# Patient Record
Sex: MALE | Race: BLACK OR AFRICAN AMERICAN | NOT HISPANIC OR LATINO | URBAN - METROPOLITAN AREA
[De-identification: names, ages, dates, MRNs, and addresses within clinical notes are randomized per-mention and may not be internally consistent; named-entity substitution may affect disease eponyms.]

---

## 2017-05-01 ENCOUNTER — HOSPITAL ENCOUNTER (EMERGENCY)
Facility: CLINIC | Age: 52
Discharge: HOME | End: 2017-05-01
Attending: PSYCHIATRY & NEUROLOGY | Admitting: PSYCHIATRY & NEUROLOGY
Payer: COMMERCIAL

## 2017-05-01 VITALS
BODY MASS INDEX: 28.69 KG/M2 | DIASTOLIC BLOOD PRESSURE: 77 MMHG | SYSTOLIC BLOOD PRESSURE: 117 MMHG | RESPIRATION RATE: 14 BRPM | OXYGEN SATURATION: 96 % | TEMPERATURE: 97 F | HEIGHT: 78 IN | HEART RATE: 102 BPM | WEIGHT: 248 LBS

## 2017-05-01 DIAGNOSIS — G47.00 PERSISTENT INSOMNIA: ICD-10-CM

## 2017-05-01 DIAGNOSIS — F43.10 PTSD (POST-TRAUMATIC STRESS DISORDER): ICD-10-CM

## 2017-05-01 DIAGNOSIS — F22 PSYCHOSIS, PARANOID (H): ICD-10-CM

## 2017-05-01 PROCEDURE — 99284 EMERGENCY DEPT VISIT MOD MDM: CPT | Mod: Z6 | Performed by: PSYCHIATRY & NEUROLOGY

## 2017-05-01 PROCEDURE — 99283 EMERGENCY DEPT VISIT LOW MDM: CPT

## 2017-05-01 RX ORDER — OLANZAPINE 10 MG/1
10 TABLET ORAL AT BEDTIME
Qty: 30 TABLET | Refills: 0 | Status: SHIPPED | OUTPATIENT
Start: 2017-05-01

## 2017-05-01 RX ORDER — HYDROXYZINE HYDROCHLORIDE 50 MG/1
50-100 TABLET, FILM COATED ORAL EVERY 6 HOURS PRN
Qty: 60 TABLET | Refills: 0 | Status: SHIPPED | OUTPATIENT
Start: 2017-05-01

## 2017-05-01 ASSESSMENT — ENCOUNTER SYMPTOMS
NERVOUS/ANXIOUS: 1
ABDOMINAL PAIN: 0
SLEEP DISTURBANCE: 1
FEVER: 0
SHORTNESS OF BREATH: 0
DYSPHORIC MOOD: 0
HALLUCINATIONS: 1
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
## 2017-05-01 NOTE — ED AVS SNAPSHOT
[facility], [place], Emergency Department    [address] [place] [address]    Phone:  [phone]    Fax:  [phone]                                       Ilya Moore   MRN: [id number]    Department:  [facility], Emergency Department   Date of Visit:  5/1/2017           After Visit Summary Signature Page     I have received my discharge instructions, and my questions have been answered. I have discussed any challenges I see with this plan with the nurse or doctor.    ..........................................................................................................................................  Patient/Patient Representative Signature      ..........................................................................................................................................  Patient Representative Print Name and Relationship to Patient    ..................................................               ................................................  Date                                            Time    ..........................................................................................................................................  Reviewed by Signature/Title    ...................................................              ..............................................  Date                                                            Time

## 2017-05-01 NOTE — ED AVS SNAPSHOT
[facility], Emergency Department    [address]    [place] [address]    Phone:  [phone]    Fax:  [phone]                                       Ilya Moore   MRN: [id number]    Department:  [facility], Emergency Department   Date of Visit:  5/1/2017           Patient Information     Date Of Birth          1965        Your diagnoses for this visit were:     PTSD (post-traumatic stress disorder)     Persistent insomnia     Psychosis, paranoid (H)        You were seen by Brandon Andrade MD.        Discharge Instructions       Restart hydroxyzine  mg every 6 hours as needed for anxiety/sleep    Restart zyprexa 10 mg at bedtime for sleep and your hallucinations    Follow up with [facility] to help contact your  and get services in place    24 Hour Appointment Hotline       To make an appointment at any [facility], call [phone] ([phone]). If you don't have a family doctor or clinic, we will help you find one. [facility] are conveniently located to serve the needs of you and your family.             Review of your medicines      CONTINUE these medicines which may have CHANGED, or have new prescriptions. If we are uncertain of the size of tablets/capsules you have at home, strength may be listed as something that might have changed.        Dose / Directions Last dose taken    hydrOXYzine 50 MG tablet   Commonly known as:  ATARAX   Dose:   mg   What changed:  when to take this   Quantity:  60 tablet        Take 1-2 tablets ( mg) by mouth every 6 hours as needed for anxiety   Refills:  0          Our records show that you are taking the medicines listed below. If these are incorrect, please call your family doctor or clinic.        Dose / Directions Last dose taken    alum & mag hydroxide-simethicone 200-200-20 MG/5ML Susp suspension   Commonly known as:  MYLANTA/MAALOX   Dose:  30 mL        Take 30 mLs by mouth every 6  hours as needed for indigestion   Refills:  0        furosemide 20 MG tablet   Commonly known as:  LASIX   Dose:  20 mg   Quantity:  30 tablet        Take 1 tablet (20 mg) by mouth daily   Refills:  1        guaiFENesin 20 mg/mL Soln solution   Commonly known as:  ROBITUSSIN   Dose:  10 mL        Take 10 mLs by mouth every 4 hours as needed for cough   Refills:  0        * IBUPROFEN PO   Dose:  200-400 mg        Take 200-400 mg by mouth every 6 hours as needed for moderate pain   Refills:  0        * ibuprofen 600 MG tablet   Commonly known as:  ADVIL/MOTRIN   Dose:  600 mg   Quantity:  120 tablet        Take 1 tablet (600 mg) by mouth every 6 hours as needed for moderate pain   Refills:  1        loperamide 2 MG capsule   Commonly known as:  IMODIUM   Dose:  2 mg        Take 2 mg by mouth 4 times daily as needed for diarrhea   Refills:  0        loratadine 10 MG tablet   Commonly known as:  CLARITIN   Dose:  10 mg        Take 10 mg by mouth daily as needed for allergies   Refills:  0        methyl salicylate-menthol Oint ointment   Dose:  1 g   Quantity:  1 Tube        Apply 1 g topically every 6 hours as needed   Refills:  1        OLANZapine 10 MG tablet   Commonly known as:  zyPREXA   Dose:  10 mg   Quantity:  30 tablet        Take 1 tablet (10 mg) by mouth At Bedtime   Refills:  0        omeprazole 20 MG CR capsule   Commonly known as:  priLOSEC   Dose:  20 mg   Quantity:  30 capsule        Take 1 capsule (20 mg) by mouth every morning (before breakfast)   Refills:  1        phenol-menthol 14.5 MG lozenge   Dose:  1 lozenge        Place 1 lozenge inside cheek every 2 hours as needed for moderate pain   Refills:  0        senna-docusate 8.6-50 MG per tablet   Commonly known as:  SENOKOT-S;PERICOLACE   Dose:  2 tablet        Take 2 tablets by mouth nightly as needed for constipation   Refills:  0        traZODone 100 MG tablet   Commonly known as:  DESYREL   Dose:  100 mg   Quantity:  60 tablet        Take 1  "tablet (100 mg) by mouth nightly as needed for sleep Patient may repeat once, if he so desires.   Refills:  1        * Notice:  This list has 2 medication(s) that are the same as other medications prescribed for you. Read the directions carefully, and ask your doctor or other care provider to review them with you.            Prescriptions were sent or printed at these locations (2 Prescriptions)                   Other Prescriptions                Printed at Department/Unit printer (2 of 2)         hydrOXYzine (ATARAX) 50 MG tablet               OLANZapine (ZYPREXA) 10 MG tablet                Orders Needing Specimen Collection     None      Pending Results     No orders found from 2017 to 2017.            Pending Culture Results     No orders found from 2017 to 2017.            Thank you for choosing [facility]       Thank you for choosing [facility] for your care. Our goal is always to provide you with excellent care. Hearing back from our patients is one way we can continue to improve our services. Please take a few minutes to complete the written survey that you may receive in the mail after you visit with us. Thank you!        [facility] Information     [facility] lets you send messages to your doctor, view your test results, renew your prescriptions, schedule appointments and more. To sign up, go to [email or web address] . Click on \"Log in\" on the left side of the screen, which will take you to the Welcome page. Then click on \"Sign up Now\" on the right side of the page.     You will be asked to enter the access code listed below, as well as some personal information. Please follow the directions to create your username and password.     Your access code is: [email or web address]  Expires: 2017 12:58 PM     Your access code will  in 90 days. If you need help or a new code, please call your [facility] clinic or [phone].        Care EveryWhere ID     This is your Care EveryWhere ID. This could be " used by other organizations to access your [place] medical records  [id number]        After Visit Summary       This is your record. Keep this with you and show to your community pharmacist(s) and doctor(s) at your next visit.

## 2017-05-01 NOTE — DISCHARGE INSTRUCTIONS
Restart hydroxyzine  mg every 6 hours as needed for anxiety/sleep    Restart zyprexa 10 mg at bedtime for sleep and your hallucinations    Follow up with [facility] services to help contact your  and get services in place

## 2017-05-17 ENCOUNTER — TRANSFERRED RECORDS (OUTPATIENT)
Dept: HEALTH INFORMATION MANAGEMENT | Facility: CLINIC | Age: 52
End: 2017-05-17

## 2017-05-17 ENCOUNTER — MEDICAL CORRESPONDENCE (OUTPATIENT)
Dept: HEALTH INFORMATION MANAGEMENT | Facility: CLINIC | Age: 52
End: 2017-05-17

## 2020-06-29 ENCOUNTER — COMMUNICATION - HEALTHEAST (OUTPATIENT)
Dept: FAMILY MEDICINE | Facility: CLINIC | Age: 55
End: 2020-06-29

## 2020-06-30 ENCOUNTER — COMMUNICATION - HEALTHEAST (OUTPATIENT)
Dept: FAMILY MEDICINE | Facility: CLINIC | Age: 55
End: 2020-06-30

## 2020-06-30 ENCOUNTER — OFFICE VISIT - HEALTHEAST (OUTPATIENT)
Dept: FAMILY MEDICINE | Facility: CLINIC | Age: 55
End: 2020-06-30

## 2020-06-30 DIAGNOSIS — M16.10 HIP ARTHRITIS: ICD-10-CM

## 2020-06-30 DIAGNOSIS — M25.551 HIP PAIN, RIGHT: ICD-10-CM

## 2020-06-30 DIAGNOSIS — G89.29 CHRONIC PAIN OF LEFT KNEE: ICD-10-CM

## 2020-06-30 DIAGNOSIS — M25.562 CHRONIC PAIN OF LEFT KNEE: ICD-10-CM

## 2020-06-30 DIAGNOSIS — M25.561 CHRONIC PAIN OF RIGHT KNEE: ICD-10-CM

## 2020-06-30 DIAGNOSIS — G89.29 CHRONIC PAIN OF RIGHT KNEE: ICD-10-CM

## 2020-06-30 DIAGNOSIS — M25.552 HIP PAIN, LEFT: ICD-10-CM

## 2020-06-30 DIAGNOSIS — F32.1 CURRENT MODERATE EPISODE OF MAJOR DEPRESSIVE DISORDER, UNSPECIFIED WHETHER RECURRENT (H): ICD-10-CM

## 2020-06-30 DIAGNOSIS — M17.10 ARTHRITIS OF KNEE: ICD-10-CM

## 2020-06-30 ASSESSMENT — MIFFLIN-ST. JEOR: SCORE: 2158.02

## 2020-06-30 ASSESSMENT — PATIENT HEALTH QUESTIONNAIRE - PHQ9: SUM OF ALL RESPONSES TO PHQ QUESTIONS 1-9: 12

## 2020-07-03 ENCOUNTER — COMMUNICATION - HEALTHEAST (OUTPATIENT)
Dept: EMERGENCY MEDICINE | Facility: CLINIC | Age: 55
End: 2020-07-03

## 2020-07-04 ENCOUNTER — COMMUNICATION - HEALTHEAST (OUTPATIENT)
Dept: BEHAVIORAL HEALTH | Facility: CLINIC | Age: 55
End: 2020-07-04

## 2020-09-23 ENCOUNTER — RECORDS - HEALTHEAST (OUTPATIENT)
Dept: LAB | Facility: CLINIC | Age: 55
End: 2020-09-23

## 2020-09-23 LAB
ALBUMIN SERPL-MCNC: 4.1 G/DL (ref 3.5–5)
ALP SERPL-CCNC: 65 U/L (ref 45–120)
ALT SERPL W P-5'-P-CCNC: 17 U/L (ref 0–45)
ANION GAP SERPL CALCULATED.3IONS-SCNC: 10 MMOL/L (ref 5–18)
AST SERPL W P-5'-P-CCNC: 12 U/L (ref 0–40)
BILIRUB SERPL-MCNC: 1.3 MG/DL (ref 0–1)
BUN SERPL-MCNC: 10 MG/DL (ref 8–22)
CALCIUM SERPL-MCNC: 8.6 MG/DL (ref 8.5–10.5)
CHLORIDE BLD-SCNC: 107 MMOL/L (ref 98–107)
CHOLEST SERPL-MCNC: 149 MG/DL
CO2 SERPL-SCNC: 25 MMOL/L (ref 22–31)
CREAT SERPL-MCNC: 1 MG/DL (ref 0.7–1.3)
FASTING STATUS PATIENT QL REPORTED: NORMAL
GFR SERPL CREATININE-BSD FRML MDRD: >60 ML/MIN/1.73M2
GLUCOSE BLD-MCNC: 87 MG/DL (ref 70–125)
HDLC SERPL-MCNC: 50 MG/DL
HIV 1+2 AB+HIV1 P24 AG SERPL QL IA: NEGATIVE
LDLC SERPL CALC-MCNC: 85 MG/DL
POTASSIUM BLD-SCNC: 3.9 MMOL/L (ref 3.5–5)
PROT SERPL-MCNC: 6.4 G/DL (ref 6–8)
PSA SERPL-MCNC: 0.2 NG/ML (ref 0–3.5)
SODIUM SERPL-SCNC: 142 MMOL/L (ref 136–145)
TRIGL SERPL-MCNC: 68 MG/DL
TSH SERPL DL<=0.005 MIU/L-ACNC: 1.14 UIU/ML (ref 0.3–5)

## 2020-09-24 ENCOUNTER — RECORDS - HEALTHEAST (OUTPATIENT)
Dept: LAB | Facility: CLINIC | Age: 55
End: 2020-09-24

## 2020-09-24 LAB
FOLATE SERPL-MCNC: 13.6 NG/ML
HBV SURFACE AG SERPL QL IA: NEGATIVE
HCV AB SERPL QL IA: NEGATIVE
IRON SATN MFR SERPL: 43 % (ref 20–50)
IRON SERPL-MCNC: 117 UG/DL (ref 42–175)
TIBC SERPL-MCNC: 274 UG/DL (ref 313–563)
TRANSFERRIN SERPL-MCNC: 219 MG/DL (ref 212–360)
VIT B12 SERPL-MCNC: 259 PG/ML (ref 213–816)

## 2020-10-16 ENCOUNTER — RECORDS - HEALTHEAST (OUTPATIENT)
Dept: LAB | Facility: CLINIC | Age: 55
End: 2020-10-16

## 2020-10-21 LAB
GAMMA INTERFERON BACKGROUND BLD IA-ACNC: 0.21 IU/ML
M TB IFN-G BLD-IMP: NEGATIVE
MITOGEN IGNF BCKGRD COR BLD-ACNC: 0.01 IU/ML
MITOGEN IGNF BCKGRD COR BLD-ACNC: 0.03 IU/ML
QTF INTERPRETATION: NORMAL
QTF MITOGEN - NIL: 5.96 IU/ML

## 2021-03-10 ENCOUNTER — RECORDS - HEALTHEAST (OUTPATIENT)
Dept: LAB | Facility: CLINIC | Age: 56
End: 2021-03-10

## 2021-03-10 LAB
ALBUMIN SERPL-MCNC: 4.2 G/DL (ref 3.5–5)
ALP SERPL-CCNC: 76 U/L (ref 45–120)
ALT SERPL W P-5'-P-CCNC: 13 U/L (ref 0–45)
ANION GAP SERPL CALCULATED.3IONS-SCNC: 8 MMOL/L (ref 5–18)
AST SERPL W P-5'-P-CCNC: 16 U/L (ref 0–40)
BILIRUB SERPL-MCNC: 1.2 MG/DL (ref 0–1)
BUN SERPL-MCNC: 10 MG/DL (ref 8–22)
CALCIUM SERPL-MCNC: 8.7 MG/DL (ref 8.5–10.5)
CHLORIDE BLD-SCNC: 106 MMOL/L (ref 98–107)
CO2 SERPL-SCNC: 27 MMOL/L (ref 22–31)
CREAT SERPL-MCNC: 1.29 MG/DL (ref 0.7–1.3)
GFR SERPL CREATININE-BSD FRML MDRD: 58 ML/MIN/1.73M2
GLUCOSE BLD-MCNC: 92 MG/DL (ref 70–125)
POTASSIUM BLD-SCNC: 4.4 MMOL/L (ref 3.5–5)
PROT SERPL-MCNC: 6.6 G/DL (ref 6–8)
SODIUM SERPL-SCNC: 141 MMOL/L (ref 136–145)

## 2021-04-01 ENCOUNTER — PATIENT OUTREACH (OUTPATIENT)
Dept: CARE COORDINATION | Facility: CLINIC | Age: 56
End: 2021-04-01

## 2021-04-01 DIAGNOSIS — Z65.9 PSYCHOSOCIAL PROBLEM: Primary | ICD-10-CM

## 2021-04-01 SDOH — SOCIAL STABILITY: SOCIAL NETWORK: HOW OFTEN DO YOU ATTEND CHURCH OR RELIGIOUS SERVICES?: MORE THAN 4 TIMES PER YEAR

## 2021-04-01 SDOH — ECONOMIC STABILITY: FOOD INSECURITY: HOW HARD IS IT FOR YOU TO PAY FOR THE VERY BASICS LIKE FOOD, HOUSING, MEDICAL CARE, AND HEATING?: NOT HARD AT ALL

## 2021-04-01 SDOH — SOCIAL STABILITY: SOCIAL INSECURITY
WITHIN THE LAST YEAR, HAVE YOU BEEN RAPED OR FORCED TO HAVE ANY KIND OF SEXUAL ACTIVITY BY YOUR PARTNER OR EX-PARTNER?: NO

## 2021-04-01 SDOH — ECONOMIC STABILITY: FOOD INSECURITY: WITHIN THE PAST 12 MONTHS, THE FOOD YOU BOUGHT JUST DIDN'T LAST AND YOU DIDN'T HAVE MONEY TO GET MORE.: NEVER TRUE

## 2021-04-01 SDOH — SOCIAL STABILITY: SOCIAL NETWORK: IN A TYPICAL WEEK, HOW MANY TIMES DO YOU TALK ON THE PHONE WITH FAMILY, FRIENDS, OR NEIGHBORS?: TWICE A WEEK

## 2021-04-01 SDOH — HEALTH STABILITY: MENTAL HEALTH: HOW OFTEN DO YOU HAVE SIX OR MORE DRINKS ON ONE OCCASION?: NEVER

## 2021-04-01 SDOH — SOCIAL STABILITY: SOCIAL INSECURITY: WITHIN THE LAST YEAR, HAVE YOU BEEN AFRAID OF YOUR PARTNER OR EX-PARTNER?: NO

## 2021-04-01 SDOH — ECONOMIC STABILITY: TRANSPORTATION INSECURITY: IN THE PAST 12 MONTHS, HAS LACK OF TRANSPORTATION KEPT YOU FROM MEDICAL APPOINTMENTS OR FROM GETTING MEDICATIONS?: NO

## 2021-04-01 SDOH — HEALTH STABILITY: PHYSICAL HEALTH: ON AVERAGE, HOW MANY DAYS PER WEEK DO YOU ENGAGE IN MODERATE TO STRENUOUS EXERCISE (LIKE A BRISK WALK)?: 0 DAYS

## 2021-04-01 SDOH — SOCIAL STABILITY: SOCIAL INSECURITY
WITHIN THE LAST YEAR, HAVE YOU BEEN KICKED, HIT, SLAPPED, OR OTHERWISE PHYSICALLY HURT BY YOUR PARTNER OR EX-PARTNER?: NO

## 2021-04-01 SDOH — SOCIAL STABILITY: SOCIAL INSECURITY: WITHIN THE LAST YEAR, HAVE YOU BEEN HUMILIATED OR EMOTIONALLY ABUSED IN OTHER WAYS BY YOUR PARTNER OR EX-PARTNER?: NO

## 2021-04-01 SDOH — ECONOMIC STABILITY: FOOD INSECURITY: WITHIN THE PAST 12 MONTHS, YOU WORRIED THAT YOUR FOOD WOULD RUN OUT BEFORE YOU GOT THE MONEY TO BUY MORE.: NEVER TRUE

## 2021-04-01 SDOH — HEALTH STABILITY: MENTAL HEALTH: HOW OFTEN DO YOU HAVE A DRINK CONTAINING ALCOHOL?: NEVER

## 2021-04-01 SDOH — SOCIAL STABILITY: SOCIAL NETWORK: HOW OFTEN DO YOU ATTEND MEETINGS OF THE CLUBS OR ORGANIZATIONS YOU BELONG TO?: NEVER

## 2021-04-01 SDOH — SOCIAL STABILITY: SOCIAL NETWORK: HOW OFTEN DO YOU GET TOGETHER WITH FRIENDS OR RELATIVES?: TWICE A WEEK

## 2021-04-01 SDOH — HEALTH STABILITY: PHYSICAL HEALTH: ON AVERAGE, HOW MANY MINUTES DO YOU ENGAGE IN EXERCISE AT THIS LEVEL?: 0 MIN

## 2021-04-01 SDOH — SOCIAL STABILITY: SOCIAL NETWORK
DO YOU BELONG TO ANY CLUBS OR ORGANIZATIONS SUCH AS CHURCH GROUPS, UNIONS, FRATERNAL OR ATHLETIC GROUPS, OR SCHOOL GROUPS?: NO

## 2021-04-01 SDOH — HEALTH STABILITY: MENTAL HEALTH
DO YOU FEEL STRESS - TENSE, RESTLESS, NERVOUS, OR ANXIOUS, OR UNABLE TO SLEEP AT NIGHT BECAUSE YOUR MIND IS TROUBLED ALL THE TIME - THESE DAYS?: RATHER MUCH

## 2021-04-01 ASSESSMENT — ACTIVITIES OF DAILY LIVING (ADL)
LACK_OF_TRANSPORTATION: NO
DEPENDENT_IADLS:: INDEPENDENT
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
## 2021-04-01 NOTE — LETTER
[facility]   [address] [place] [address]  April 1, 2021    Ilya ORTIZ [facility] [address]      Dear Ilya,    I am a clinic care coordinator who works with Lavonne Cochran MD at [facility]. I wanted to thank you for spending the time to talk with me.  Below is a description of clinic care coordination and how I can further assist you.      The clinic care coordination team is made up of a registered nurse,  and community health worker who understand the health care system. The goal of clinic care coordination is to help you manage your health and improve access to the health care system in the most efficient manner. The team can assist you in meeting your health care goals by providing education, coordinating services, strengthening the communication among your providers and supporting you with any resource needs.    Please feel free to contact me at [phone] with any questions or concerns. We are focused on providing you with the highest-quality healthcare experience possible and that all starts with you.     Sincerely,   KIRK Plata  Clinic Care Coordinator  [phone]  [email or web address]

## 2021-04-01 NOTE — LETTER
[facility]  Complex Care Plan  About Me:    Patient Name:  Ilya Moore    YOB: 1965  Age:         56 year old   Farshad MRN:    [id number] Telephone Information:  Home Phone [phone]   Mobile [phone]   Home Phone [phone]   Mobile [phone]       Address:  [address] [place] [address] Email address:  [email or web address]      Emergency Contact(s)    Name Relationship Lgl Grd Work Phone Home Phone Mobile Phone   GURJIT BUCK Mother   [phone]            Primary language:  English     needed? No   [employer]:  [phone] op. 1  Other communication barriers: None  Preferred Method of Communication:     Current living arrangement: Other  Mobility Status/ Medical Equipment: Independent    Health Maintenance  Health Maintenance Reviewed:      My Access Plan  Medical Emergency 911   Primary Clinic Line   -  [facility], [phone]   24 Hour Appointment Line [phone] or  [phone]) (toll-free)   24 Hour Nurse Line [phone] (toll-free)   Preferred Urgent Care Other   Preferred Hospital Other   Preferred Pharmacy No Pharmacies Listed   Behavioral Health Crisis Line The National Suicide Prevention Lifeline at 1-844.963.5905 or 911             My Care Team Members  Patient Care Team       Relationship Specialty Notifications Start End    Lavonne Cochran MD PCP - General Family Medicine  4/1/21     Phone: [phone] Fax: [phone]         [facility] [address] [place] [address]    Juli Sterling LSW Lead Care Coordinator   4/1/21             My Care Plans  Self Management and Treatment Plan  Goals and (Comments)  Goals        General    1.Healthy Coping (pt-stated)     Notes - Note edited  4/1/2021  3:25 PM by Juli Sterling LSW    Goal Statement: I would like to be able to see my family who are living in an apartment where I am unable to visit due to it  "being deemed \"trespassing\" within the next 3 months.   Date Goal set: 4/1/2021  Barriers: Land lord   Strengths: Strong advocate for self   Date to Achieve By: 7/1/2021  Patient expressed understanding of goal: Yes  Action steps to achieve this goal:  1. CC SW to provide [employer] contact information to patient for renter advocacy; [phone]  2. Patient to give contact information to his fiance to outreach to CC SW for support.   3. Patient/fiance will call CC SW with questions or concerns.              Advance Care Plans/Directives Type:        My Medical and Care Information  Problem List   Patient Active Problem List   Diagnosis     Psychosis, paranoid (H)     Chemical dependency (H)      Current Medications and Allergies:  See printed Medication Report.    Care Coordination Start Date: 4/1/2021   Frequency of Care Coordination: monthly   Form Last Updated: 04/01/2021       "

## 2021-05-03 ENCOUNTER — PATIENT OUTREACH (OUTPATIENT)
Dept: CARE COORDINATION | Facility: CLINIC | Age: 56
End: 2021-05-03

## 2021-05-03 DIAGNOSIS — Z65.9 PSYCHOSOCIAL PROBLEM: Primary | ICD-10-CM

## 2021-05-03 NOTE — PROGRESS NOTES
Clinic Care Coordination Contact  [facility]       Clinical Data: Care Coordinator Outreach  Outreach attempted x 1.  Left message with staff at his home, patient is still currently admitted to [facility] S/P hip replacement, left.   Plan: Care Coordinator will try to reach patient again in 1 month.       KIRK Plata  Clinic Care Coordinator  [phone]  [email or web address]

## 2021-05-27 ASSESSMENT — PATIENT HEALTH QUESTIONNAIRE - PHQ9: SUM OF ALL RESPONSES TO PHQ QUESTIONS 1-9: 12

## 2021-06-04 VITALS
OXYGEN SATURATION: 98 % | SYSTOLIC BLOOD PRESSURE: 128 MMHG | RESPIRATION RATE: 18 BRPM | HEART RATE: 86 BPM | WEIGHT: 261 LBS | BODY MASS INDEX: 30.2 KG/M2 | DIASTOLIC BLOOD PRESSURE: 68 MMHG | HEIGHT: 78 IN
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
## 2021-06-09 NOTE — TELEPHONE ENCOUNTER
1st attempt to contact patient    No way to leave message for patient, mailbox full    Patient needs to have COVID SCREEN for appt tomorrow 06/30 with Dr. Mcqueen    Please complete COVID screen and update appt note and complete this note when done    Thank you    Kenyatta Hutchison, CMA

## 2021-06-09 NOTE — PROGRESS NOTES
"Assessment / Impression     1. Hip pain, left  Ambulatory referral to Pain Clinic    XR Pelvis W 2 Vw Hips Bilateral    XR Pelvis W 2 Vw Hips Bilateral   2. Hip pain, right  Ambulatory referral to Pain Clinic    XR Pelvis W 2 Vw Hips Bilateral    XR Pelvis W 2 Vw Hips Bilateral   3. Chronic pain of left knee  XR Knee Bilateral Plus Sunrise VW    XR Knee Bilateral Plus Sunrise VW   4. Chronic pain of right knee  XR Knee Bilateral Plus Sunrise VW    XR Knee Bilateral Plus Sunrise VW   5. Current moderate episode of major depressive disorder, unspecified whether recurrent (H)  buPROPion (WELLBUTRIN XL) 150 MG 24 hr tablet         Plan:     Patient has history of chronic bilateral hip and knee pain.  Patient does believe he had bilateral knee injections earlier this month, though cannot recall which clinic.  Because I do not have records of any recent imaging, would recommend x-rays of his hip and knee pain, will inform patient of results when we have them.  He is also interested in seeing pain clinic, as he thought I was a pain clinic provider, therefore referral was ordered today.  He did not need additional medications as he just received prednisone and topical Voltaren from the ED today.  Also instructed patient on limiting ibuprofen use to prevent ulcer.    Per patient request, refills of bupropion were given to patient.  He is also taking Seroquel 100 mg daily, though I did inform patient that I do not manage this medication, and if he does need refills, he needs to get in touch with his previous provider.    Return if symptoms worsen or fail to improve.    Subjective:      HPI: Ilya Moore is a 55 y.o. male, new to [facility], who presents for \"I'm in so much pain.\" He is currently living at [facility].  He also has a history of depression, PTSD.  He has been seen in ED 3 times this past month for same concern, most recently as earlier today.  At today's visit, he received a " "prescription for topical Voltaren, prednisone, and omeprazole, though he has not picked up medication yet.  He thought this was the pain clinic and he was seeing a pain clinic provider.  He notes significant hips and knee pain.  He said he had bilateral knee injections about 1 month ago for \"bone on bone\" disease.  Not sure which clinic he was seen at.  Tells me he was taking \"too much ibuprofen\"--2400mg per day, then felt like it upset stomach.  Now taking ibuprofen total of 800mg per day.      Also is taking Wellbutrin 150mg daily for depression.  Denies any active suicidal or homicidal ideation, and feels that most of his mood is related to his pain that is not well controlled.      Medical History:     There is no problem list on file for this patient.      History reviewed. No pertinent past medical history.    Past Surgical History:   Procedure Laterality Date     NO PAST SURGERIES         Current Medications:     Current Outpatient Medications   Medication Sig     buPROPion (WELLBUTRIN XL) 150 MG 24 hr tablet Take 1 tablet (150 mg total) by mouth every morning.     diclofenac sodium (VOLTAREN) 1 % Gel Apply topically twice daily to knees and left hip.     omeprazole (PRILOSEC) 20 MG capsule Take 1 capsule (20 mg total) by mouth daily before breakfast.     predniSONE (DELTASONE) 20 MG tablet Take 40 mg by mouth daily for 4 days.     QUEtiapine (SEROQUEL) 100 MG tablet        Family History:     Family History   Problem Relation Age of Onset     Arthritis Mother         hip and knee replacement     Obesity Father        Review of Systems  All other systems reviewed and are negative.         Social History:     Social History     Tobacco Use   Smoking Status Never Smoker   Smokeless Tobacco Never Used     Social History     Social History Narrative    Currently living in shelter.  Not currently working-disabled due to PTSD.  Looking for part-time work.          Objective:     /68 (Patient Site: Right Arm, " "Patient Position: Sitting, Cuff Size: Adult Regular)   Pulse 86   Resp 18   Ht 6' 7\" (2.007 m)   Wt (!) 261 lb (118.4 kg)   SpO2 98%   BMI 29.40 kg/m    Physical Examination: General appearance - alert, well appearing, and in no distress  Eyes: extraocular eye movements intact  Ears: normal external ears, clear canals,  Left TM appears normal, with no redness or bulging noted.  Right TM appears normal, with no redness or bulging noted.  Mouth: mucous membranes moist, pharynx normal without lesions  Neck: supple, no significant adenopathy or thyromegaly  Lungs: clear to auscultation, no wheezes, rales or rhonchi, symmetric air entry  Heart: normal rate, regular rhythm, normal S1, S2, no murmurs.  Abdomen: soft, nontender, nondistended, no masses or organomegaly  Neurological: alert, oriented, normal speech, no focal findings or movement disorder noted.    Musculoskeletal: Right knee: Decreased range of motion with flexion and extension.  No tenderness palpation along joint line.  Left knee: Decreased range of motion with flexion and extension.  Tenderness palpation along joint line medially and laterally.  Right hip: Decreased range of motion with internal and external rotation as well as flexion.  Left knee: Decreased range of motion with internal and external rotation and flexion.  Extremities: No edema, no clubbing or cyanosis  Psychiatric: Normal affect. Does not appear anxious or depressed.    No results found for this or any previous visit (from the past 168 hour(s)).      Nvea Mcqueen MD  6/30/2020  1:22 PM        "
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
## 2021-06-15 ENCOUNTER — PATIENT OUTREACH (OUTPATIENT)
Dept: CARE COORDINATION | Facility: CLINIC | Age: 56
End: 2021-06-15

## 2021-06-15 DIAGNOSIS — Z65.9 PSYCHOSOCIAL PROBLEM: Primary | ICD-10-CM

## 2021-06-15 ASSESSMENT — ACTIVITIES OF DAILY LIVING (ADL): DEPENDENT_IADLS:: INDEPENDENT

## 2021-06-15 NOTE — PROGRESS NOTES
Clinic Care Coordination Contact  [facility]    Referral Source: PCP  Clinical Data: Care Coordinator Outreach  Outreach attempted x 2.  Patient's phone number was busy x2.   Plan:  Care Coordinator will try to reach patient again in 1 month.      KIRK Plata  Clinic Care Coordinator  [phone]  [email or web address]

## 2021-06-20 NOTE — LETTER
Letter by Neva Mcqueen MD at      Author: Neva Mcqueen MD Service: -- Author Type: --    Filed:  Encounter Date: 6/30/2020 Status: (Other)         Ilya Moore  [address] [place] [address]             June 30, 2020         Dear Mr. Moore,    Below are the results from your recent visit:    Resulted Orders   XR Knee Bilateral Plus Sunrise VW    Narrative    EXAM DATE:         06/30/2020    EXAM: X-RAY KNEES BILATERAL, 3 VIEWS  LOCATION: [facility]  DATE/TIME: 6/30/2020 2:30 PM    INDICATION: Bilateral knee pain.  COMPARISON: None.    IMPRESSION:  Degenerative narrowing of the medial compartments of both knees. Severe and  chronic appearing enthesitis along the extensor tendon attachments to the poles  of the patella bilaterally. Small left knee joint effusion. No evidence for  acute appearing fracture.                 Here are your hip x-ray results, which do show significant joint space narrowing.  In addition to pain clinic, I'm going to give you a referral to orthopedics.      Please call with questions or contact us using [employer].    Sincerely,        Electronically signed by Neva Mcqueen MD

## 2021-06-20 NOTE — LETTER
Letter by Anais Boggs RN at      Author: Anais Boggs RN Service: -- Author Type: --    Filed:  Encounter Date: 7/3/2020 Status: (Other)       7/3/2020        Michael R Starks [address] [place] [address]    This letter provides a written record that you were tested for COVID-19 on 7/2/20.     Your result was negative. This means that we didnt find the virus that causes COVID-19 in your sample. A test may show negative when you do actually have the virus. This can happen when the virus is in the early stages of infection, before you feel illness symptoms.    If you have symptoms   Stay home and away from others (self-isolate) until you meet ALL of the guidelines below:    Youve had no fever--and no medicine that reduces fever--for 3 full days (72 hours). And ?    Your other symptoms have gotten better. For example, your cough or breathing has improved. And?    At least 10 days have passed since your symptoms started.    During this time:    Stay home. Dont go to work, school or anywhere else.     Stay in your own room, including for meals. Use your own bathroom if you can.    Stay away from others in your home. No hugging, kissing or shaking hands. No visitors.    Clean high touch surfaces often (doorknobs, counters, handles, etc.). Use a household cleaning spray or wipes. You can find a full list on the EPA website at www.epa.gov/pesticide-registration/list-n-disinfectants-use-against-sars-cov-2.    Cover your mouth and nose with a mask, tissue or washcloth to avoid spreading germs.    Wash your hands and face often with soap and water.    Going back to work  Check with your employer for any guidelines to follow for going back to work.    Employers: This document serves as formal notice that your employee tested negative for COVID-19, as of the testing date shown above.

## 2021-06-20 NOTE — LETTER
Letter by Neva Mcqueen MD at      Author: Neva Mcqueen MD Service: -- Author Type: --    Filed:  Encounter Date: 6/30/2020 Status: (Other)         Ilya Moore  [address] [place] [address]             June 30, 2020         Dear Mr. Moore,    Below are the results from your recent visit:    Resulted Orders   XR Pelvis W 2 Vw Hips Bilateral    Narrative    EXAM DATE:         06/30/2020    EXAM: X-RAY HIPS, BILATERAL, WITH AP PELVIS  LOCATION: [facility]  DATE/TIME: 6/30/2020 2:15 PM    INDICATION: Bilateral hip pain.  COMPARISON: None.    IMPRESSION:  Severe degenerative change at both hip joints with marked joint space narrowing.  Osteophytic spurring along the margins of the acetabulum likely predisposing to  pincer-type impingement. No evidence for acute appearing fracture. Pelvis  otherwise negative for fracture.                 Here are your hip x-ray results, which do show significant joint space narrowing.  In addition to pain clinic, I'm going to give you a referral to orthopedics.      Please call with questions or contact us using [employer].    Sincerely,        Electronically signed by Neva Mcqueen MD

## 2021-07-16 ENCOUNTER — PATIENT OUTREACH (OUTPATIENT)
Dept: CARE COORDINATION | Facility: CLINIC | Age: 56
End: 2021-07-16

## 2021-07-16 NOTE — PROGRESS NOTES
Clinic Care Coordination Contact  [facility]/Voicemail       Clinical Data: Care Coordinator Outreach  Outreach attempted x 2.  Left message on patient's voicemail with call back information and requested return call.  Plan:  Care Coordinator will try to reach patient again in 3-5 business days.    Jo Ann Davenport, [facility]  Clinic Care Cordinator  [facility]   [phone]

## 2021-07-20 ENCOUNTER — LAB REQUISITION (OUTPATIENT)
Dept: LAB | Facility: CLINIC | Age: 56
End: 2021-07-20
Payer: MEDICAID

## 2021-07-20 DIAGNOSIS — I49.9 CARDIAC ARRHYTHMIA, UNSPECIFIED: ICD-10-CM

## 2021-07-20 LAB
ANION GAP SERPL CALCULATED.3IONS-SCNC: 12 MMOL/L (ref 5–18)
BUN SERPL-MCNC: 14 MG/DL (ref 8–22)
CALCIUM SERPL-MCNC: 9.2 MG/DL (ref 8.5–10.5)
CHLORIDE BLD-SCNC: 107 MMOL/L (ref 98–107)
CO2 SERPL-SCNC: 22 MMOL/L (ref 22–31)
CREAT SERPL-MCNC: 1.22 MG/DL (ref 0.7–1.3)
GFR SERPL CREATININE-BSD FRML MDRD: 65 ML/MIN/1.73M2
GLUCOSE BLD-MCNC: 114 MG/DL (ref 70–125)
MAGNESIUM SERPL-MCNC: 1.8 MG/DL (ref 1.8–2.6)
POTASSIUM BLD-SCNC: 4.1 MMOL/L (ref 3.5–5)
SODIUM SERPL-SCNC: 141 MMOL/L (ref 136–145)

## 2021-07-20 PROCEDURE — 83735 ASSAY OF MAGNESIUM: CPT | Mod: ORL | Performed by: FAMILY MEDICINE

## 2021-07-20 PROCEDURE — 80048 BASIC METABOLIC PNL TOTAL CA: CPT | Mod: ORL | Performed by: FAMILY MEDICINE

## 2021-07-22 ENCOUNTER — PATIENT OUTREACH (OUTPATIENT)
Dept: CARE COORDINATION | Facility: CLINIC | Age: 56
End: 2021-07-22

## 2021-07-22 NOTE — PROGRESS NOTES
"Clinic Care Coordination Contact    Follow Up Progress Note      Assessment: EDITH SHIPLEY called pt to check in. Pt stated that he was in a lot of pain and waiting for is meds to get to the pharmacy. Pt was confused as to why they weren't ready yet since he called yesterday. Pt also stated he hadn't gotten anywhere with the eviction notice and hadn't looked into it really yet. CC VIOLETTA gave the pt the information for the home line again. Conversation was brief as pt was in pain and just in relief to here the order was sent back to the pharmacy.     Goals addressed this encounter:   Goals Addressed                    This Visit's Progress       1.Healthy Coping (pt-stated)   20%      Goal Statement: I would like to be able to see my family who are living in an apartment where I am unable to visit due to it being deemed \"trespassing\" within the next 3 months.   Date Goal set: 7/22/2019  Barriers: Land lord   Strengths: Strong advocate for self   Date to Achieve By: 10/22/2019  Patient expressed understanding of goal: Yes  Action steps to achieve this goal:  1. CC SW to provide Home Line contact information to patient for renter advocacy; [phone]  2. Patient to give contact information to his fiance to outreach to CC VIOLETTA for support.   3. Patient/fiance will call CC VIOLETTA with questions or concerns.              Intervention/Education provided during outreach: EDITH SHIPLEY gave the pt information for Home Line.      Outreach Frequency: monthly    Plan:   Pt to follow up with home line. Pt to follow up with CC VIOLETTA with any questions or concerns.   Care Coordinator will follow up in 1 month.    CHRIS Wayne  Clinic Care Cordinator  [facility]   [phone]    "

## 2021-09-01 ENCOUNTER — PATIENT OUTREACH (OUTPATIENT)
Dept: CARE COORDINATION | Facility: CLINIC | Age: 56
End: 2021-09-01

## 2021-09-01 NOTE — PROGRESS NOTES
"Clinic Care Coordination Contact    Follow Up Progress Note      Assessment: CC SW called the pt to check in. Pt was sleeping and sounded very out of it. Pt stated that he was really tired. CC SW kept the conversation brief and the pt stated he was doing good.     Care Gaps:    Health Maintenance Due   Topic Date Due     PREVENTIVE CARE VISIT  Never done     ADVANCE CARE PLANNING  Never done     DEPRESSION ACTION PLAN  Never done     COLORECTAL CANCER SCREENING  Never done     HEPATITIS B IMMUNIZATION (1 of 3 - Risk 3-dose series) Never done     ZOSTER IMMUNIZATION (1 of 2) Never done     PHQ-9  12/30/2020     INFLUENZA VACCINE (1) 09/01/2021       Currently there are no Care Gaps.    Goals addressed this encounter:   Goals Addressed                    This Visit's Progress       1.Healthy Coping (pt-stated)   20%      Goal Statement: I would like to be able to see my family who are living in an apartment where I am unable to visit due to it being deemed \"trespassing\" within the next 3 months.   Date Goal set: 7/22/202021  Barriers: Land lord   Strengths: Strong advocate for self   Date to Achieve By: 10/22/2021  Patient expressed understanding of goal: Yes  Action steps to achieve this goal:  1. CC SW to provide Home Line contact information to patient for renter advocacy; [phone]  2. Patient to give contact information to his fiance to outreach to CC SW for support.   3. Patient/fiance will call CC SW with questions or concerns.               Outreach Frequency: monthly    Plan:   Pt to contact the CC SW with any questions or concerns.   Care Coordinator will follow up in 1 month.    Jo Ann Davenport ANA M  Clinic Care Cordinator  [facility]   [phone]    "

## 2021-10-06 ENCOUNTER — LAB REQUISITION (OUTPATIENT)
Dept: LAB | Facility: CLINIC | Age: 56
End: 2021-10-06
Payer: MEDICAID

## 2021-10-06 DIAGNOSIS — Z11.3 ENCOUNTER FOR SCREENING FOR INFECTIONS WITH A PREDOMINANTLY SEXUAL MODE OF TRANSMISSION: ICD-10-CM

## 2021-10-06 LAB
ALBUMIN SERPL-MCNC: 3.7 G/DL (ref 3.5–5)
ALP SERPL-CCNC: 73 U/L (ref 45–120)
ALT SERPL W P-5'-P-CCNC: 15 U/L (ref 0–45)
ANION GAP SERPL CALCULATED.3IONS-SCNC: 9 MMOL/L (ref 5–18)
AST SERPL W P-5'-P-CCNC: 18 U/L (ref 0–40)
BILIRUB SERPL-MCNC: 0.6 MG/DL (ref 0–1)
BUN SERPL-MCNC: 11 MG/DL (ref 8–22)
CALCIUM SERPL-MCNC: 8.9 MG/DL (ref 8.5–10.5)
CHLORIDE BLD-SCNC: 107 MMOL/L (ref 98–107)
CO2 SERPL-SCNC: 28 MMOL/L (ref 22–31)
CREAT SERPL-MCNC: 1.11 MG/DL (ref 0.7–1.3)
GFR SERPL CREATININE-BSD FRML MDRD: 73 ML/MIN/1.73M2
GLUCOSE BLD-MCNC: 93 MG/DL (ref 70–125)
HIV 1+2 AB+HIV1 P24 AG SERPL QL IA: NEGATIVE
POTASSIUM BLD-SCNC: 4.6 MMOL/L (ref 3.5–5)
PROT SERPL-MCNC: 6.3 G/DL (ref 6–8)
SODIUM SERPL-SCNC: 144 MMOL/L (ref 136–145)

## 2021-10-06 PROCEDURE — 86803 HEPATITIS C AB TEST: CPT | Mod: ORL | Performed by: FAMILY MEDICINE

## 2021-10-06 PROCEDURE — 87491 CHLMYD TRACH DNA AMP PROBE: CPT | Mod: ORL | Performed by: FAMILY MEDICINE

## 2021-10-06 PROCEDURE — 86780 TREPONEMA PALLIDUM: CPT | Mod: ORL | Performed by: FAMILY MEDICINE

## 2021-10-06 PROCEDURE — 87340 HEPATITIS B SURFACE AG IA: CPT | Mod: ORL | Performed by: FAMILY MEDICINE

## 2021-10-06 PROCEDURE — 82040 ASSAY OF SERUM ALBUMIN: CPT | Performed by: FAMILY MEDICINE

## 2021-10-06 PROCEDURE — 87389 HIV-1 AG W/HIV-1&-2 AB AG IA: CPT | Mod: ORL | Performed by: FAMILY MEDICINE

## 2021-10-07 LAB
HBV SURFACE AG SERPL QL IA: NONREACTIVE
HCV AB SERPL QL IA: NEGATIVE
T PALLIDUM AB SER QL: NEGATIVE

## 2021-10-08 LAB
C TRACH DNA SPEC QL PROBE+SIG AMP: NEGATIVE
N GONORRHOEA DNA SPEC QL NAA+PROBE: NEGATIVE

## 2021-10-18 ENCOUNTER — PATIENT OUTREACH (OUTPATIENT)
Dept: CARE COORDINATION | Facility: CLINIC | Age: 56
End: 2021-10-18
Payer: MEDICAID
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
## 2021-12-15 ENCOUNTER — PATIENT OUTREACH (OUTPATIENT)
Dept: CARE COORDINATION | Facility: CLINIC | Age: 56
End: 2021-12-15
Payer: MEDICAID

## 2021-12-15 NOTE — PROGRESS NOTES
Clinic Care Coordination Contact  [facility]/Voicemail       Clinical Data: Care Coordinator Outreach  Outreach attempted x 1.  Left message on patient's voicemail with call back information and requested return call.  Plan:  Care Coordinator will try to reach patient again in 1 month.      Jo Ann Davenport [facility]  Clinic Care Coordinator  [facility]   [phone]

## 2022-02-17 ENCOUNTER — PATIENT OUTREACH (OUTPATIENT)
Dept: CARE COORDINATION | Facility: CLINIC | Age: 57
End: 2022-02-17
Payer: MEDICAID

## 2022-02-17 NOTE — PROGRESS NOTES
Clinic Care Coordination Contact    Follow Up Progress Note      Assessment: VIOLETTA SHARMA contacted Ilya for monthly outreach. He reported that he had fallen and slipped on ice, hurt his hip and was admitted to the hospital. He reported that this fall occurred yesterday, nothing was broken and that he is doing okay. Ilya did not report needing any additional supports or resources. He understands how to contact VIOLETTA SHARMA if a need arises.    Care Gaps:    Health Maintenance Due   Topic Date Due     PREVENTIVE CARE VISIT  Never done     ADVANCE CARE PLANNING  Never done     DEPRESSION ACTION PLAN  Never done     COLORECTAL CANCER SCREENING  Never done     HEPATITIS B IMMUNIZATION (1 of 3 - Risk 3-dose series) Never done     PHQ-9  12/30/2020     INFLUENZA VACCINE (1) 09/01/2021       Intervention/Education provided during outreach: VIOLETTA SHARMA outreach     Outreach Frequency: monthly    Plan:  VIOLETTA SHARMA will outreach within one month.    CHRIS Jackson  , Care Coordination  [facility]  [facility]  [facility]  [phone]

## 2022-04-21 ENCOUNTER — PATIENT OUTREACH (OUTPATIENT)
Dept: CARE COORDINATION | Facility: CLINIC | Age: 57
End: 2022-04-21
Payer: MEDICAID

## 2022-05-09 ENCOUNTER — LAB REQUISITION (OUTPATIENT)
Dept: LAB | Facility: CLINIC | Age: 57
End: 2022-05-09
Payer: MEDICAID

## 2022-05-09 DIAGNOSIS — Z01.818 ENCOUNTER FOR OTHER PREPROCEDURAL EXAMINATION: ICD-10-CM

## 2022-05-09 LAB
ANION GAP SERPL CALCULATED.3IONS-SCNC: 12 MMOL/L (ref 5–18)
BUN SERPL-MCNC: 8 MG/DL (ref 8–22)
CALCIUM SERPL-MCNC: 9 MG/DL (ref 8.5–10.5)
CHLORIDE BLD-SCNC: 107 MMOL/L (ref 98–107)
CO2 SERPL-SCNC: 24 MMOL/L (ref 22–31)
CREAT SERPL-MCNC: 1.16 MG/DL (ref 0.7–1.3)
GFR SERPL CREATININE-BSD FRML MDRD: 73 ML/MIN/1.73M2
GLUCOSE BLD-MCNC: 91 MG/DL (ref 70–125)
POTASSIUM BLD-SCNC: 4.3 MMOL/L (ref 3.5–5)
SODIUM SERPL-SCNC: 143 MMOL/L (ref 136–145)
URATE SERPL-MCNC: 7.3 MG/DL (ref 3–8)

## 2022-05-09 PROCEDURE — 80048 BASIC METABOLIC PNL TOTAL CA: CPT | Mod: ORL | Performed by: FAMILY MEDICINE

## 2022-05-09 PROCEDURE — 84550 ASSAY OF BLOOD/URIC ACID: CPT | Mod: ORL | Performed by: FAMILY MEDICINE

## 2022-06-01 ENCOUNTER — PATIENT OUTREACH (OUTPATIENT)
Dept: CARE COORDINATION | Facility: CLINIC | Age: 57
End: 2022-06-01
Payer: MEDICAID

## 2022-06-01 NOTE — PROGRESS NOTES
Clinic Care Coordination Contact  Care Team Conversations    VIOLETTA CC contacted patient to check-in. VIOLETTA CC was told that patient was unavailable to come to the phone at this time. VIOLETTA SHARMA will attempt outreach in 1 week.     CHRIS Li for CHRIS Wayne  Clinic Care Coordination  [facility]

## 2022-06-02 NOTE — PROGRESS NOTES
Clinic Care Coordination Contact  Care Team Conversations    CC VIOLETTA spoke with the pt to check in. Pt stated that things were going well for him. Pt stated that he is looking forward to surgery for his hip replacement. Pt stated that is where he is currently at right now.     There are no known care gaps at this time.     EDITH SHIPLEY will outreached to the pt in 1 month.     Pt to contact the EDITH SHIPLEY with any questions or concerns.     CHRIS Wayne  Social Work Care Coordinator - [facility]  Care Coordination  [email or web address]  Cell Phone: [phone]  Gender pronouns: she/her  Employed by [employer]

## 2022-07-22 ENCOUNTER — PATIENT OUTREACH (OUTPATIENT)
Dept: CARE COORDINATION | Facility: CLINIC | Age: 57
End: 2022-07-22

## 2022-07-22 NOTE — PROGRESS NOTES
Clinic Care Coordination Contact    Follow Up Progress Note      Assessment: VIOLETTA SHARMA contacted patient to check in. Patient stated everything is still going fine and he does not need any additional resources. Patient reported he needs to make a follow up appointment from his hip surgery and stated he can contact the clinic and make the appt.     Patient shared he would like to talk with his primary VIOLETTA Davenport.    Care Gaps:    Health Maintenance Due   Topic Date Due     ADVANCE CARE PLANNING  Never done     DEPRESSION ACTION PLAN  Never done     Pneumococcal Vaccine: Pediatrics (0 to 5 Years) and At-Risk Patients (6 to 64 Years) (1 - PCV) Never done     HEPATITIS B IMMUNIZATION (1 of 3 - Risk 3-dose series) Never done     PHQ-9  12/30/2020     PREVENTIVE CARE VISIT  09/23/2021     COVID-19 Vaccine (4 - Booster for Moderna series) 04/20/2022     ZOSTER IMMUNIZATION (2 of 2) 03/24/2022         Goals addressed this encounter:    Goals Addressed    None         Intervention/Education provided during outreach: Patient verbalized understanding, engaged in [employer]IDET communication during patient encounter.       Outreach Frequency: monthly      Plan:   VIOLETTA SHARMA will share information with VIOLETTA Davenport. Patient will contact VIOLETTA SHARMA with any questions or concerns.   Care Coordinator will follow up in 1 month.     CHRIS Li for CHRIS Wayne  Clinic Care Coordination  [facility]  [email or web address]  [phone]

## 2022-12-14 ENCOUNTER — PATIENT OUTREACH (OUTPATIENT)
Dept: CARE COORDINATION | Facility: CLINIC | Age: 57
End: 2022-12-14

## 2022-12-14 NOTE — PROGRESS NOTES
Clinic Care Coordination - Chart Review    Care Coordination reviewed patient's chart in [facility] and patient's home clinic EMR in collaboration with VIOLETTA Cherry. No outstanding patient needs noted per chart review. Patient will be closed to Care Coordination at this time. Provider to refer patient to Care Coordination should future needs arise.    Manuel Shaikh RN  Ambulatory Care